# Patient Record
Sex: MALE | Race: WHITE | NOT HISPANIC OR LATINO | Employment: FULL TIME | URBAN - METROPOLITAN AREA
[De-identification: names, ages, dates, MRNs, and addresses within clinical notes are randomized per-mention and may not be internally consistent; named-entity substitution may affect disease eponyms.]

---

## 2018-05-30 ENCOUNTER — OFFICE VISIT (OUTPATIENT)
Dept: URGENT CARE | Facility: RETAIL CLINIC | Age: 38
End: 2018-05-30
Payer: COMMERCIAL

## 2018-05-30 VITALS
SYSTOLIC BLOOD PRESSURE: 120 MMHG | DIASTOLIC BLOOD PRESSURE: 77 MMHG | TEMPERATURE: 97.8 F | HEART RATE: 62 BPM | OXYGEN SATURATION: 98 %

## 2018-05-30 DIAGNOSIS — L02.619 CELLULITIS AND ABSCESS OF FOOT, EXCEPT TOES: Primary | ICD-10-CM

## 2018-05-30 DIAGNOSIS — L03.119 CELLULITIS AND ABSCESS OF FOOT, EXCEPT TOES: Primary | ICD-10-CM

## 2018-05-30 PROCEDURE — 99203 OFFICE O/P NEW LOW 30 MIN: CPT | Performed by: FAMILY MEDICINE

## 2018-05-30 RX ORDER — CEPHALEXIN 500 MG/1
500 CAPSULE ORAL 3 TIMES DAILY
Qty: 30 CAPSULE | Refills: 0 | Status: SHIPPED | OUTPATIENT
Start: 2018-05-30 | End: 2018-12-02

## 2018-05-30 NOTE — PROGRESS NOTES
SUBJECTIVE  Angel Luis Hurt Jr. is a 37 year old male who presents with a chief complaint of an animal bite on the feet right.  .shira was bitten by a unknown animal two days ago.   Cicumstances of bite: in water.  Severity: mild.     Associated symptoms: redness noted, increasing pain     last tetanus booster within 10 years    Past Medical History:   Diagnosis Date     Closed fracture of unspecified part of tibia 1988       Current Outpatient Prescriptions:      acetaminophen (TYLENOL) 325 MG tablet, Take 2 tablets (650 mg) by mouth every 4 hours as needed for mild pain, Disp: 100 tablet, Rfl: 0     cephALEXin (KEFLEX) 500 MG capsule, Take 1 capsule (500 mg) by mouth 3 times daily, Disp: 30 capsule, Rfl: 0     omega 3 1000 MG CAPS, Take 1 g by mouth daily, Disp: 90 capsule, Rfl:      ORDER FOR DME, Equipment being ordered: crutches (Patient not taking: Reported on 5/30/2018), Disp: 1 Device, Rfl: 0  History   Smoking Status     Never Smoker   Smokeless Tobacco     Never Used         ROS:  Review of systems negative except as stated above.    OBJECTIVE:  /77  Pulse 62  Temp 97.8  F (36.6  C) (Oral)  SpO2 98%  GENERAL: healthy, alert no acute distress  SKIN: 2 puncture wounds of foot right dorsum with moderate surrounding swelling and redness.  MS:extremities normal- no gross deformities noted,  FROM noted in all extremities  NEURO: Normal strength and tone, sensory exam grossly normal,  normal speech and mentation    ASSESSMENT:  puncture wound    PLAN:  Cephalexin, soapy soaks  Follow up with primary care provider in no improvement.

## 2018-05-30 NOTE — PATIENT INSTRUCTIONS
Cellulitis  Cellulitis is an infection of the deep layers of skin. A break in the skin, such as a cut or scratch, can let bacteria under the skin. If the bacteria get to deep layers of the skin, it can be serious. If not treated, cellulitis can get into the bloodstream and lymph nodes. The infection can then spread throughout the body. This causes serious illness.  Cellulitis causes the affected skin to become red, swollen, warm, and sore. The reddened areas have a visible border. An open sore may leak fluid (pus). You may have a fever, chills, and pain.  Cellulitis is treated with antibiotics taken for 7 to 10 days. An open sore may be cleaned and covered with cool wet gauze. Symptoms should get better 1 to 2 days after treatment is started. Make sure to take all the antibiotics for the full number of days until they are gone. Keep taking the medicine even if your symptoms go away.  Home care  Follow these tips:    Limit the use of the part of your body with cellulitis.     If the infection is on your leg, keep your leg raised while sitting. This will help to reduce swelling.    Take all of the antibiotic medicine exactly as directed until it is gone. Do not miss any doses, especially during the first 7 days. Don t stop taking the medicine when your symptoms get better.    Keep the affected area clean and dry.    Wash your hands with soap and warm water before and after touching your skin. Anyone else who touches your skin should also wash his or her hands. Don't share towels.  Follow-up care  Follow up with your healthcare provider, or as advised. If your infection does not go away on the first antibiotic, your healthcare provider will prescribe a different one.  When to seek medical advice  Call your healthcare provider right away if any of these occur:    Red areas that spread    Swelling or pain that gets worse    Fluid leaking from the skin (pus)    Fever higher of 100.4  F (38.0  C) or higher after 2 days  on antibiotics  Date Last Reviewed: 9/1/2016 2000-2017 [employer]. [address] [place] [address], [place] [address]. All rights reserved. This information is not intended as a substitute for professional medical care. Always follow your healthcare professional's instructions.

## 2018-05-30 NOTE — MR AVS SNAPSHOT
After Visit Summary   5/30/2018    Angel Luis Hurt Jr.    MRN: [id number]           Patient Information     Date Of Birth          1980        Visit Information        Provider Department      5/30/2018 12:20 PM Peter Riddle MD [facility]        Today's Diagnoses     Cellulitis and abscess of foot, except toes    -  1      Care Instructions      Cellulitis  Cellulitis is an infection of the deep layers of skin. A break in the skin, such as a cut or scratch, can let bacteria under the skin. If the bacteria get to deep layers of the skin, it can be serious. If not treated, cellulitis can get into the bloodstream and lymph nodes. The infection can then spread throughout the body. This causes serious illness.  Cellulitis causes the affected skin to become red, swollen, warm, and sore. The reddened areas have a visible border. An open sore may leak fluid (pus). You may have a fever, chills, and pain.  Cellulitis is treated with antibiotics taken for 7 to 10 days. An open sore may be cleaned and covered with cool wet gauze. Symptoms should get better 1 to 2 days after treatment is started. Make sure to take all the antibiotics for the full number of days until they are gone. Keep taking the medicine even if your symptoms go away.  Home care  Follow these tips:    Limit the use of the part of your body with cellulitis.     If the infection is on your leg, keep your leg raised while sitting. This will help to reduce swelling.    Take all of the antibiotic medicine exactly as directed until it is gone. Do not miss any doses, especially during the first 7 days. Don t stop taking the medicine when your symptoms get better.    Keep the affected area clean and dry.    Wash your hands with soap and warm water before and after touching your skin. Anyone else who touches your skin should also wash his or her hands. Don't share towels.  Follow-up care  Follow up with your healthcare  provider, or as advised. If your infection does not go away on the first antibiotic, your healthcare provider will prescribe a different one.  When to seek medical advice  Call your healthcare provider right away if any of these occur:    Red areas that spread    Swelling or pain that gets worse    Fluid leaking from the skin (pus)    Fever higher of 100.4  F (38.0  C) or higher after 2 days on antibiotics  Date Last Reviewed: 9/1/2016 2000-2017 [employer]. [address] [place] [address]. All rights reserved. This information is not intended as a substitute for professional medical care. Always follow your healthcare professional's instructions.                Follow-ups after your visit        Who to contact     You can reach your care team any time of the day by calling [phone].  Notification of test results:  If you have an abnormal lab result, we will notify you by phone as soon as possible.         Additional Information About Your Visit        Care EveryWhere ID     This is your Care EveryWhere ID. This could be used by other organizations to access your [facility] medical records  [id number]        Your Vitals Were     Pulse Temperature Pulse Oximetry             62 97.8  F (36.6  C) (Oral) 98%          Blood Pressure from Last 3 Encounters:   05/30/18 120/77   10/27/15 120/72   03/25/15 132/70    Weight from Last 3 Encounters:   10/27/15 189 lb 6.4 oz (85.9 kg)   10/21/14 191 lb 4.8 oz (86.8 kg)   10/21/14 191 lb 4.8 oz (86.8 kg)              Today, you had the following     No orders found for display         Today's Medication Changes          These changes are accurate as of 5/30/18 12:20 PM.  If you have any questions, ask your nurse or doctor.               Start taking these medicines.        Dose/Directions    cephALEXin 500 MG capsule   Commonly known as:  KEFLEX   Used for:  Cellulitis and abscess of foot, except toes   Started by:  Peter Riddle MD        Dose:   500 mg   Take 1 capsule (500 mg) by mouth 3 times daily   Quantity:  30 capsule   Refills:  0            Where to get your medicines      These medications were sent to [facility] - [place], [place] - [address]  [address], [facility] [address]     Phone:  [phone]     cephALEXin 500 MG capsule                Primary Care Provider Office Phone # Fax #    Jonathan Lizarraga [phone]       XXX RESIGNED  [address] [facility] [address]        Equal Access to Services     [facility]: Hadii aad ku hadasho Soomaali, waaxda luqadaha, qaybta kaalmada adeegyada, waxay ryin haylane guidry . So [facility] [phone].    ATENCIÓN: Si habla español, tiene a ventura disposición servicios gratuitos de asistencia lingüística. [facility] [phone].    We comply with applicable federal civil rights laws and [place] laws. We do not discriminate on the basis of race, color, national origin, age, disability, sex, sexual orientation, or gender identity.            Thank you!     Thank you for choosing [facility]  for your care. Our goal is always to provide you with excellent care. Hearing back from our patients is one way we can continue to improve our services. Please take a few minutes to complete the written survey that you may receive in the mail after your visit with us. Thank you!             Your Updated Medication List - Protect others around you: Learn how to safely use, store and throw away your medicines at www.disposemymeds.org.          This list is accurate as of 5/30/18 12:20 PM.  Always use your most recent med list.                   Brand Name Dispense Instructions for use Diagnosis    acetaminophen 325 MG tablet    TYLENOL    100 tablet    Take 2 tablets (650 mg) by mouth every 4 hours as needed for mild pain    Sprain of ankle, left, initial encounter       cephALEXin 500 MG capsule    KEFLEX    30 capsule    Take 1 capsule (500 mg) by mouth 3 times  daily    Cellulitis and abscess of foot, except toes       omega 3 1000 MG Caps     90 capsule    Take 1 g by mouth daily    Sprain of ankle, left, initial encounter       order for DME     1 Device    Equipment being ordered: crutches    Sprain of ankle, left, initial encounter

## 2018-06-14 ENCOUNTER — OFFICE VISIT (OUTPATIENT)
Dept: INTERNAL MEDICINE | Facility: CLINIC | Age: 38
End: 2018-06-14
Payer: COMMERCIAL

## 2018-06-14 VITALS
RESPIRATION RATE: 14 BRPM | OXYGEN SATURATION: 98 % | WEIGHT: 203 LBS | HEIGHT: 70 IN | DIASTOLIC BLOOD PRESSURE: 82 MMHG | TEMPERATURE: 96.9 F | SYSTOLIC BLOOD PRESSURE: 118 MMHG | HEART RATE: 86 BPM | BODY MASS INDEX: 29.06 KG/M2

## 2018-06-14 DIAGNOSIS — L03.115 CELLULITIS OF RIGHT LOWER EXTREMITY: ICD-10-CM
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
## 2018-06-14 PROCEDURE — 99213 OFFICE O/P EST LOW 20 MIN: CPT | Performed by: INTERNAL MEDICINE

## 2018-06-14 RX ORDER — MINOCYCLINE HYDROCHLORIDE 100 MG/1
100 CAPSULE ORAL 2 TIMES DAILY
Qty: 28 CAPSULE | Refills: 0 | Status: SHIPPED | OUTPATIENT
Start: 2018-06-14 | End: 2018-12-02

## 2018-06-14 ASSESSMENT — PAIN SCALES - GENERAL: PAINLEVEL: NO PAIN (0)

## 2018-06-14 NOTE — MR AVS SNAPSHOT
"              After Visit Summary   6/14/2018    Angel Luis Hurt Jr.    MRN: [id number]           Patient Information     Date Of Birth          1980        Visit Information        Provider Department      6/14/2018 9:40 AM Quinton Mckinney DO [facility]        Today's Diagnoses     Bug bite, initial encounter    -  1    Cellulitis of right lower extremity           Follow-ups after your visit        Follow-up notes from your care team     Return if symptoms worsen or fail to improve.      Your next 10 appointments already scheduled     Jun 14, 2018  9:40 AM CDT   SHORT with Quinton Mckinney DO   [facility] ([facility])    [address] [place] [id number] [phone]              Who to contact     If you have questions or need follow up information about today's clinic visit or your schedule please contact [facility] directly at [phone].  Normal or non-critical lab and imaging results will be communicated to you by MyChart, letter or phone within 4 business days after the clinic has received the results. If you do not hear from us within 7 days, please contact the clinic through MyChart or phone. If you have a critical or abnormal lab result, we will notify you by phone as soon as possible.  Submit refill requests through [employer] or call your pharmacy and they will forward the refill request to us. Please allow 3 business days for your refill to be completed.          Additional Information About Your Visit        Care EveryWhere ID     This is your Care EveryWhere ID. This could be used by other organizations to access your [facility] medical records  [id number]        Your Vitals Were     Pulse Temperature Respirations Height Pulse Oximetry BMI (Body Mass Index)    86 96.9  F (36.1  C) (Temporal) 14 5' 10\" (1.778 m) 98% 29.13 kg/m2       Blood Pressure from Last 3 Encounters:   06/14/18 118/82 "   05/30/18 120/77   10/27/15 120/72    Weight from Last 3 Encounters:   06/14/18 203 lb (92.1 kg)   10/27/15 189 lb 6.4 oz (85.9 kg)   10/21/14 191 lb 4.8 oz (86.8 kg)              Today, you had the following     No orders found for display         Today's Medication Changes          These changes are accurate as of 6/14/18  9:30 AM.  If you have any questions, ask your nurse or doctor.               Start taking these medicines.        Dose/Directions    minocycline 100 MG capsule   Commonly known as:  MINOCIN/DYNACIN   Used for:  Bug bite, initial encounter, Cellulitis of right lower extremity   Started by:  Quinton Mckinney DO        Dose:  100 mg   Take 1 capsule (100 mg) by mouth 2 times daily   Quantity:  28 capsule   Refills:  0            Where to get your medicines      These medications were sent to [facility], [place] - [address]   [address] , [facility] [address]     Phone:  [phone]     minocycline 100 MG capsule                Primary Care Provider Fax #    Physician No Ref-Primary [phone]       No address on file        Equal Access to Services     [facility]: Hadii bo galicia Soally, waaxda luqadaha, qaybta kaalmada adeelmo, guevara guidry . So [facility] [phone].    ATENCIÓN: Si habla español, tiene a ventura disposición servicios gratuitos de asistencia lingüística. [facility] [phone].    We comply with applicable federal civil rights laws and [place] laws. We do not discriminate on the basis of race, color, national origin, age, disability, sex, sexual orientation, or gender identity.            Thank you!     Thank you for choosing [facility]  for your care. Our goal is always to provide you with excellent care. Hearing back from our patients is one way we can continue to improve our services. Please take a few minutes to complete the written survey that you may receive in the mail after  your visit with us. Thank you!             Your Updated Medication List - Protect others around you: Learn how to safely use, store and throw away your medicines at www.disposemymeds.org.          This list is accurate as of 6/14/18  9:30 AM.  Always use your most recent med list.                   Brand Name Dispense Instructions for use Diagnosis    acetaminophen 325 MG tablet    TYLENOL    100 tablet    Take 2 tablets (650 mg) by mouth every 4 hours as needed for mild pain    Sprain of ankle, left, initial encounter       cephALEXin 500 MG capsule    KEFLEX    30 capsule    Take 1 capsule (500 mg) by mouth 3 times daily    Cellulitis and abscess of foot, except toes       minocycline 100 MG capsule    MINOCIN/DYNACIN    28 capsule    Take 1 capsule (100 mg) by mouth 2 times daily    Bug bite, initial encounter, Cellulitis of right lower extremity       omega 3 1000 MG Caps     90 capsule    Take 1 g by mouth daily    Sprain of ankle, left, initial encounter       order for DME     1 Device    Equipment being ordered: crutches    Sprain of ankle, left, initial encounter

## 2018-06-14 NOTE — PROGRESS NOTES
"  SUBJECTIVE:   Angel Luis Hurt Jr. is a 37 year old male who presents to clinic today for the following health issues:    Rash  Onset: 3 weeks ago    Description:   Location: right foot  Character: round, raised, red  Itching (Pruritis): YES    Progression of Symptoms:  worsening    Accompanying Signs & Symptoms:  Fever: no   Body aches or joint pain: no   Sore throat symptoms: no   Recent cold symptoms: no     History:   Previous similar rash: no     Precipitating factors:   Exposure to similar rash: no   New exposures: bought property and went in to water and appeared after went to express care they gave antibiotic and it improved and after antibiotic gone then it returned   Recent travel: no     Alleviating factors:                      Chief Complaint         The patient is a pleasant 37-year-old gentleman who recently was [place] [facility].  He was walking in and out of the water as well as the deep grafts.  He was found to have a\"Animal bite\" on his right foot.  There was some localized swelling and erythema and he was placed on Keflex and had immediate results.  Unfortunately, upon conclusion of the antibiotic, the area became more red and he now has a cellulitis involving the dorsal aspect of his right foot to the ankle.  He notes that this is somewhat painful, there is some associated swelling.                       PAST, FAMILY,SOCIAL HISTORY:     Medical  History:   has a past medical history of Closed fracture of unspecified part of tibia (1988).     Surgical History:   has no past surgical history on file.     Social History:   reports that he has never smoked. He has never used smokeless tobacco. He reports that he does not drink alcohol or use illicit drugs.     Family History:  family history is not on file.            MEDICATIONS  Current Outpatient Prescriptions   Medication Sig Dispense Refill     minocycline (MINOCIN/DYNACIN) 100 MG capsule Take 1 capsule (100 mg) " "by mouth 2 times daily 28 capsule 0     omega 3 1000 MG CAPS Take 1 g by mouth daily 90 capsule      acetaminophen (TYLENOL) 325 MG tablet Take 2 tablets (650 mg) by mouth every 4 hours as needed for mild pain 100 tablet 0     cephALEXin (KEFLEX) 500 MG capsule Take 1 capsule (500 mg) by mouth 3 times daily (Patient not taking: Reported on 6/14/2018) 30 capsule 0     ORDER FOR DME Equipment being ordered: crutches (Patient not taking: Reported on 5/30/2018) 1 Device 0         --------------------------------------------------------------------------------------------------------------------                              Review of Systems     LUNGS: Pt denies: cough,excess sputum, hemoptysis, or shortness of breath.   HEART: Pt denies: chest pain, arrythmia, syncope, tachy or bradyarrhythmia.   GI: Pt denies: nausea, vomitting, diarrhea, constipation, melena, or hematochezia.   NEURO: Pt denies: seizures, strokes, diplopia, weakness, paraesthesias, or paralysis.                                     Examination      /82 (BP Location: Left arm, Patient Position: Sitting, Cuff Size: Adult Regular)  Pulse 86  Temp 96.9  F (36.1  C) (Temporal)  Resp 14  Ht 5' 10\" (1.778 m)  Wt 203 lb (92.1 kg)  SpO2 98%  BMI 29.13 kg/m2   Constitutional: The patient appears to be in no acute distress. The patient appears to be adequately hydrated. No acute respiratory or hemodynamic distress is noted at this time.   LUNGS: clear bilaterally, airflow is brisk, no intercostal retraction or stridor is noted. No coughing is noted during visit.   HEART:  regular without rubs, clicks, gallops, or murmurs. PMI is nondisplaced. Upstrokes are brisk. S1,S2 are heard.   SKIN:  warm and dry.  The dorsal aspect of the foot is erythematous as noted.  No other signs of infection are noted.  No \"bull's-eye\" lesions are seen at this time.  The erythematous rash that he has is not blanchable.  It is somewhat nodular in appearance but " homogenous.                                           Decision Making    1. Bug bite, initial encounter  Cannot rule out tick.  Having been walking in and out of the grass, this would be more likely than a water borne creature.  - minocycline (MINOCIN/DYNACIN) 100 MG capsule; Take 1 capsule (100 mg) by mouth 2 times daily  Dispense: 28 capsule; Refill: 0    2. Cellulitis of right lower extremity  Secondary to the insect bite.  We will treat as a potential for Lyme disease.  - minocycline (MINOCIN/DYNACIN) 100 MG capsule; Take 1 capsule (100 mg) by mouth 2 times daily  Dispense: 28 capsule; Refill: 0                           FOLLOW UP   I have asked the patient to make an appointment for followup with me in 1 week if rash is not improved.  Otherwise he will continue the antibiotic for a total of 2 weeks.        I have carefully explained the diagnosis and treatment options to the patient.  The patient has displayed an understanding of the above, and all subsequent questions were answered.      DO DAVID Castellon    Portions of this note were produced using [employer]  Although every attempt at real-time proof reading has been made, occasional grammar/syntax errors may have been missed.

## 2018-12-02 ENCOUNTER — OFFICE VISIT (OUTPATIENT)
Dept: URGENT CARE | Facility: RETAIL CLINIC | Age: 38
End: 2018-12-02
Payer: COMMERCIAL

## 2018-12-02 VITALS
SYSTOLIC BLOOD PRESSURE: 145 MMHG | HEART RATE: 75 BPM | TEMPERATURE: 100.9 F | OXYGEN SATURATION: 99 % | DIASTOLIC BLOOD PRESSURE: 84 MMHG

## 2018-12-02 DIAGNOSIS — J02.9 ACUTE PHARYNGITIS, UNSPECIFIED ETIOLOGY: Primary | ICD-10-CM

## 2018-12-02 LAB — S PYO AG THROAT QL IA.RAPID: ABNORMAL

## 2018-12-02 PROCEDURE — 99213 OFFICE O/P EST LOW 20 MIN: CPT | Performed by: INTERNAL MEDICINE

## 2018-12-02 PROCEDURE — 87880 STREP A ASSAY W/OPTIC: CPT | Mod: QW | Performed by: INTERNAL MEDICINE

## 2018-12-02 RX ORDER — PENICILLIN V POTASSIUM 500 MG/1
500 TABLET, FILM COATED ORAL 3 TIMES DAILY
Qty: 30 TABLET | Refills: 0 | Status: SHIPPED | OUTPATIENT
Start: 2018-12-02 | End: 2018-12-12

## 2018-12-02 NOTE — MR AVS SNAPSHOT
"              After Visit Summary   2018    Angel Luis Hurt Jr.    MRN: [id number]           Patient Information     Date Of Birth          1980        Visit Information        Provider Department      2018 2:10 PM Iman Claire MD [facility]        Today's Diagnoses     Acute pharyngitis, unspecified etiology    -  1       Follow-ups after your visit        Your next 10 appointments already scheduled     Dec 02, 2018  2:10 PM CST   SHORT with Iman Claire MD   [facility] ([facility])    [address]  [facility] [id number] [phone]              Who to contact     You can reach your care team any time of the day by calling [phone].  Notification of test results:  If you have an abnormal lab result, we will notify you by phone as soon as possible.         Additional Information About Your Visit        MyChart Information     [facility] lets you send messages to your doctor, view your test results, renew your prescriptions, schedule appointments and more. To sign up, go to [email or web address] . Click on \"Log in\" on the left side of the screen, which will take you to the Welcome page. Then click on \"Sign up Now\" on the right side of the page.     You will be asked to enter the access code listed below, as well as some personal information. Please follow the directions to create your username and password.     Your access code is: [email or web address]  Expires: 3/2/2019  2:08 PM     Your access code will  in 90 days. If you need help or a new code, please call your [facility] clinic or [phone].        Care EveryWhere ID     This is your Care EveryWhere ID. This could be used by other organizations to access your [facility] medical records  [id number]        Your Vitals Were     Pulse Temperature Pulse Oximetry             75 100.9  F (38.3  C) (Oral) 99%          Blood Pressure from Last 3 Encounters:   18 145/84 "   06/14/18 118/82   05/30/18 120/77    Weight from Last 3 Encounters:   06/14/18 203 lb (92.1 kg)   10/27/15 189 lb 6.4 oz (85.9 kg)   10/21/14 191 lb 4.8 oz (86.8 kg)              We Performed the Following     RAPID STREP SCREEN          Today's Medication Changes          These changes are accurate as of 12/2/18  2:08 PM.  If you have any questions, ask your nurse or doctor.               Start taking these medicines.        Dose/Directions    penicillin V 500 MG tablet   Commonly known as:  VEETID   Used for:  Acute pharyngitis, unspecified etiology   Started by:  Iman Claire MD        Dose:  500 mg   Take 1 tablet (500 mg) by mouth 3 times daily for 10 days   Quantity:  30 tablet   Refills:  0            Where to get your medicines      These medications were sent to [address]  [address], [facility] [address]     Phone:  [phone]     penicillin V 500 MG tablet                Primary Care Provider Fax #    Physician No Ref-Primary [phone]       No address on file        Equal Access to Services     [facility]: Hadwanda galicia Soally, waaxda luvolodymyr, qaybta kaalmabret montgomery, guevara trevino. So [facility] [phone].    ATENCIÓN: Si habla español, tiene a ventura disposición servicios gratuitos de asistencia lingüística. Jonn al [phone].    We comply with applicable federal civil rights laws and [place] laws. We do not discriminate on the basis of race, color, national origin, age, disability, sex, sexual orientation, or gender identity.            Thank you!     Thank you for choosing [facility]  for your care. Our goal is always to provide you with excellent care. Hearing back from our patients is one way we can continue to improve our services. Please take a few minutes to complete the written survey that you may receive in the mail after your visit with us. Thank you!             Your Updated  Medication List - Protect others around you: Learn how to safely use, store and throw away your medicines at www.disposemymeds.org.          This list is accurate as of 12/2/18  2:08 PM.  Always use your most recent med list.                   Brand Name Dispense Instructions for use Diagnosis    acetaminophen 325 MG tablet    TYLENOL    100 tablet    Take 2 tablets (650 mg) by mouth every 4 hours as needed for mild pain    Sprain of ankle, left, initial encounter       omega 3 1000 MG Caps     90 capsule    Take 1 g by mouth daily    Sprain of ankle, left, initial encounter       order for DME     1 Device    Equipment being ordered: crutches    Sprain of ankle, left, initial encounter       penicillin V 500 MG tablet    VEETID    30 tablet    Take 1 tablet (500 mg) by mouth 3 times daily for 10 days    Acute pharyngitis, unspecified etiology

## 2018-12-02 NOTE — PROGRESS NOTES
[facility]        Iman Claire MD, MPH  12/02/2018        History:      Angel Luis Hurt Jr. is a 38 year old male with a chief complaint of sore throat and fever.  Onset of symptoms was 4 day(s) ago.    No dyspnea or wheezing or cough  No vomiting    No diarrhea  No abdominal pain  Eating and drinking well  No rash         Assessment and Plan:         Acute pharyngitis:    - RAPID STREP SCREEN: Positive.   - penicillin V (VEETID) 500 MG tablet; Take 1 tablet (500 mg) by mouth 3 times daily for 10 days  Dispense: 30 tablet; Refill: 0  Advised patient to increase/encourage fluid intake and rest.  Advised to discard current tooth brush.  Advised to stay home and rest today and tomorrow.  Tylenol  Every 6 hours as needed alternating with ibuprofen every 6 hours as needed for pain and fever.  F/u w PCP in 3-4  days, earlier if symptoms worsen.                   Physical Exam:      /84  Pulse 75  Temp 100.9  F (38.3  C) (Oral)  SpO2 99%     Constitutional: Patient is in no distress The patient is pleasant and cooperative.   HEENT: Head:  Head is atraumatic, normocephalic.    Eyes: Pupils are equal, round and reactive to light and accomodation.  Sclera is non-icteric. No conjunctival injection, or exudate noted. Extraocular motion is intact. Visual acuity is intact bilaterally.  Ears:  External acoustic canals are patent and clear.  There is no erythema and bulging( exudate)  of the ( R/L ) tympanic membrane(s ).   Nose:  No Nasal congestion, drainage or mucosal ulceration is noted.  Throat:  Oral mucosa is moist.  No oral lesions are noted.  Posterior pharyngeal hyperemia w exudate noted.     Neck Supple.  There is cervical lymphadenopathy.  No nuchal rigidity noted.  There is no meningismus.     Cardiovascular:  Chest Wall: Heart is regular to rate and rhythm.  No murmur is noted.       Lungs: Clear in the anterior and posterior pulmonary fields.   Abdomen: Soft and non-tender.     Back No flank tenderness is noted.   Extremeties No edema, no calf tenderness.   Neuro: No focal deficit.   Skin No petechiae or purpura is noted.  There is no rash.   Mood Normal              Data:      All new lab and imaging data was reviewed.   Results for orders placed or performed in visit on 12/02/18   RAPID STREP SCREEN   Result Value Ref Range    Rapid Strep A Screen pos neg

## 2022-06-15 ENCOUNTER — OFFICE VISIT (OUTPATIENT)
Dept: FAMILY MEDICINE | Facility: CLINIC | Age: 42
End: 2022-06-15
Payer: COMMERCIAL

## 2022-06-15 VITALS
WEIGHT: 187 LBS | TEMPERATURE: 97.6 F | RESPIRATION RATE: 16 BRPM | HEART RATE: 56 BPM | DIASTOLIC BLOOD PRESSURE: 76 MMHG | OXYGEN SATURATION: 97 % | BODY MASS INDEX: 26.83 KG/M2 | SYSTOLIC BLOOD PRESSURE: 120 MMHG

## 2022-06-15 DIAGNOSIS — L03.116 CELLULITIS OF LEFT LOWER EXTREMITY: Primary | ICD-10-CM

## 2022-06-15 PROCEDURE — 99203 OFFICE O/P NEW LOW 30 MIN: CPT | Performed by: FAMILY MEDICINE

## 2022-06-15 RX ORDER — CEPHALEXIN 500 MG/1
500 CAPSULE ORAL 3 TIMES DAILY
Qty: 30 CAPSULE | Refills: 0 | Status: SHIPPED | OUTPATIENT
Start: 2022-06-15

## 2022-06-15 RX ORDER — DIPHENHYDRAMINE HCL 25 MG
25 TABLET ORAL EVERY 6 HOURS PRN
COMMUNITY

## 2022-06-15 RX ORDER — SULFAMETHOXAZOLE/TRIMETHOPRIM 800-160 MG
1 TABLET ORAL 2 TIMES DAILY
Qty: 20 TABLET | Refills: 0 | Status: SHIPPED | OUTPATIENT
Start: 2022-06-15

## 2022-06-15 ASSESSMENT — PAIN SCALES - GENERAL: PAINLEVEL: MILD PAIN (2)

## 2022-06-15 NOTE — PROGRESS NOTES
Assessment & Plan     Cellulitis of left lower extremity  Had a short attached to his left posterior ankle pulled it off 2 days ago.  Has been in the water since.  The area was bleeding a little.  Now the whole area is reddened and little warm.  He was at work today on it all day.  A little uncomfortable.  This looks like a cellulitis organ to treat him with dual antibiotics as noted below but he really needs to watch his closely as if the ankle joint is involved we need further treatment plans.  Told him to be off it tonight and if he wakes up tomorrow and its not much better he should stay off it tomorrow.  - cephALEXin (KEFLEX) 500 MG capsule; Take 1 capsule (500 mg) by mouth 3 times daily  - sulfamethoxazole-trimethoprim (BACTRIM DS) 800-160 MG tablet; Take 1 tablet by mouth 2 times daily                 No follow-ups on file.    Bob Henry MD  [facility] DREW Hein is a 41 year old presenting for the following health issues: See discussion below and above.  Wound Check (Had leech stuck to left ankle, now has redness and itches)      Wound Check    History of Present Illness       Reason for visit:  Bit    He eats 2-3 servings of fruits and vegetables daily.He consumes 0 sweetened beverage(s) daily.He exercises with enough effort to increase his heart rate 60 or more minutes per day.  He exercises with enough effort to increase his heart rate 4 days per week.   He is taking medications regularly.             Review of Systems   Constitutional, HEENT, cardiovascular, pulmonary, gi and gu systems are negative, except as otherwise noted.      Objective    /76   Pulse 56   Temp 97.6  F (36.4  C) (Temporal)   Resp 16   Wt 84.8 kg (187 lb)   SpO2 97%   BMI 26.83 kg/m    Body mass index is 26.83 kg/m .  Physical Exam   GENERAL: healthy, alert and no distress  MS: Left lateral ankle swollen in the area reddened and warm.  Calf is soft and nontender.  SKIN: Mall  ulceration just posterior of the left lateral malleolus where Arellano was attached.  Red and warm in the area.  No streaking going up the leg.  NEURO: Normal strength and tone, mentation intact and speech normal  PSYCH: mentation appears normal, affect normal/bright                    .  ..

## 2024-02-13 ENCOUNTER — NURSE TRIAGE (OUTPATIENT)
Dept: FAMILY MEDICINE | Facility: CLINIC | Age: 44
End: 2024-02-13
Payer: COMMERCIAL

## 2024-02-13 NOTE — TELEPHONE ENCOUNTER
Patient stepped on a nail.  He is soaking the area.  He had a shoe on when he stepped on the nail.    Per protocol patient advised to get a tetanus shot and be seen in clinic. Patient agrees with the plan.  Berenice Heredia RN on 2/13/2024 at 1:47 PM    Reason for Disposition   Puncture wound of foot and puncture went through shoe (e.g., tennis shoe)    Additional Information   Negative: Shock suspected (e.g., cold/pale/clammy skin, too weak to stand, low BP, rapid pulse)   Negative: Puncture wound of head, neck, chest, back, or abdomen that sounds life-threatening to triager   Negative: Sounds like a life-threatening emergency to the triager   Negative: Caused by an animal bite   Negative: Skin is cut or scraped, not punctured   Negative: Puncture wound of eye or eyelid   Negative: Foreign body is still in the skin (e.g., splinter, sliver, fishhook)   Negative: Puncture wound of head, neck, chest, abdomen, or overlying a joint and it could be deep   Negative: Needlestick from used or discarded injection needle  (Exception: Clean, unused needle.)   Negative: High pressure injection injury (e.g., from grease gun or paint gun, usually work-related)   Negative: Dirt in the wound and not removed with 15 minutes of scrubbing   Negative: Sounds like a serious injury to the triager   Negative: SEVERE pain (e.g., excruciating)   Negative: Puncture wound of foot and hurts too much to walk on (i.e., unable to bear weight, severe limp)   Negative: Puncture wound of bare foot (no shoes) and setting was dirty    Protocols used: Puncture Wound-A-OH